# Patient Record
Sex: FEMALE | Race: WHITE | Employment: OTHER | URBAN - METROPOLITAN AREA
[De-identification: names, ages, dates, MRNs, and addresses within clinical notes are randomized per-mention and may not be internally consistent; named-entity substitution may affect disease eponyms.]

---

## 2017-01-01 ENCOUNTER — APPOINTMENT (OUTPATIENT)
Dept: LAB | Facility: HOSPITAL | Age: 82
End: 2017-01-01
Attending: INTERNAL MEDICINE
Payer: MEDICARE

## 2017-01-01 ENCOUNTER — SNF/IP PROF CHARGE ONLY (OUTPATIENT)
Dept: INTERNAL MEDICINE CLINIC | Facility: CLINIC | Age: 82
End: 2017-01-01

## 2017-01-01 DIAGNOSIS — R53.1 GENERAL WEAKNESS: ICD-10-CM

## 2017-01-01 DIAGNOSIS — M54.50 CHRONIC BILATERAL LOW BACK PAIN WITHOUT SCIATICA: ICD-10-CM

## 2017-01-01 DIAGNOSIS — I48.91 ATRIAL FIBRILLATION (HCC): ICD-10-CM

## 2017-01-01 DIAGNOSIS — M25.551 HIP PAIN, RIGHT: ICD-10-CM

## 2017-01-01 DIAGNOSIS — S32.000D: Primary | ICD-10-CM

## 2017-01-01 DIAGNOSIS — I10 ESSENTIAL HYPERTENSION: ICD-10-CM

## 2017-01-01 DIAGNOSIS — I48.20 CHRONIC ATRIAL FIBRILLATION (HCC): ICD-10-CM

## 2017-01-01 DIAGNOSIS — G89.29 CHRONIC BILATERAL LOW BACK PAIN WITHOUT SCIATICA: Primary | ICD-10-CM

## 2017-01-01 DIAGNOSIS — R26.2 DIFFICULTY WALKING: ICD-10-CM

## 2017-01-01 DIAGNOSIS — R26.2 DIFFICULTY IN WALKING: ICD-10-CM

## 2017-01-01 DIAGNOSIS — G89.29 CHRONIC BILATERAL LOW BACK PAIN WITHOUT SCIATICA: ICD-10-CM

## 2017-01-01 DIAGNOSIS — M54.50 CHRONIC BILATERAL LOW BACK PAIN WITHOUT SCIATICA: Primary | ICD-10-CM

## 2017-01-01 DIAGNOSIS — S32.050S: ICD-10-CM

## 2017-01-01 DIAGNOSIS — I48.20 ATRIAL FIBRILLATION, CHRONIC (HCC): ICD-10-CM

## 2017-01-01 DIAGNOSIS — Z79.01 LONG TERM (CURRENT) USE OF ANTICOAGULANTS: ICD-10-CM

## 2017-01-01 DIAGNOSIS — Z98.890 STATUS POST LUMBAR SPINE SURGERY FOR DECOMPRESSION OF SPINAL CORD: ICD-10-CM

## 2017-01-01 LAB
INR BLD: 2.7 (ref 0.9–1.2)
PROTHROMBIN TIME: 28.7 SECONDS (ref 11.8–14.5)

## 2017-01-01 PROCEDURE — 99308 SBSQ NF CARE LOW MDM 20: CPT | Performed by: INTERNAL MEDICINE

## 2017-01-01 PROCEDURE — 85610 PROTHROMBIN TIME: CPT

## 2017-01-01 PROCEDURE — 36415 COLL VENOUS BLD VENIPUNCTURE: CPT

## 2017-01-01 PROCEDURE — 99305 1ST NF CARE MODERATE MDM 35: CPT | Performed by: INTERNAL MEDICINE

## 2017-01-17 ENCOUNTER — APPOINTMENT (OUTPATIENT)
Dept: LAB | Facility: HOSPITAL | Age: 82
End: 2017-01-17
Attending: INTERNAL MEDICINE
Payer: MEDICARE

## 2017-01-17 DIAGNOSIS — Z79.01 LONG TERM (CURRENT) USE OF ANTICOAGULANTS: ICD-10-CM

## 2017-01-17 DIAGNOSIS — I48.91 ATRIAL FIBRILLATION (HCC): ICD-10-CM

## 2017-01-17 LAB
INR BLD: 2.3 (ref 0.9–1.2)
PROTHROMBIN TIME: 25.5 SECONDS (ref 11.8–14.5)

## 2017-01-17 PROCEDURE — 36415 COLL VENOUS BLD VENIPUNCTURE: CPT

## 2017-01-17 PROCEDURE — 85610 PROTHROMBIN TIME: CPT

## 2017-02-27 PROCEDURE — 99305 1ST NF CARE MODERATE MDM 35: CPT | Performed by: INTERNAL MEDICINE

## 2017-03-02 PROCEDURE — 99308 SBSQ NF CARE LOW MDM 20: CPT | Performed by: INTERNAL MEDICINE

## 2017-03-04 ENCOUNTER — SNF/IP PROF CHARGE ONLY (OUTPATIENT)
Dept: INTERNAL MEDICINE CLINIC | Facility: CLINIC | Age: 82
End: 2017-03-04

## 2017-03-04 DIAGNOSIS — I10 ESSENTIAL HYPERTENSION: ICD-10-CM

## 2017-03-04 DIAGNOSIS — M54.50 ACUTE BILATERAL LOW BACK PAIN WITHOUT SCIATICA: Primary | ICD-10-CM

## 2017-03-04 DIAGNOSIS — Z98.1 S/P LAMINECTOMY WITH SPINAL FUSION: ICD-10-CM

## 2017-03-04 DIAGNOSIS — I48.20 CHRONIC ATRIAL FIBRILLATION (HCC): ICD-10-CM

## 2017-03-04 DIAGNOSIS — R26.2 DIFFICULTY WALKING: ICD-10-CM

## 2017-03-06 ENCOUNTER — SNF/IP PROF CHARGE ONLY (OUTPATIENT)
Dept: INTERNAL MEDICINE CLINIC | Facility: CLINIC | Age: 82
End: 2017-03-06

## 2017-03-06 DIAGNOSIS — M54.50 CHRONIC BILATERAL LOW BACK PAIN WITHOUT SCIATICA: Primary | ICD-10-CM

## 2017-03-06 DIAGNOSIS — G89.29 CHRONIC BILATERAL LOW BACK PAIN WITHOUT SCIATICA: Primary | ICD-10-CM

## 2017-03-06 DIAGNOSIS — I48.20 CHRONIC ATRIAL FIBRILLATION (HCC): ICD-10-CM

## 2017-03-06 DIAGNOSIS — I10 ESSENTIAL HYPERTENSION: ICD-10-CM

## 2017-03-06 DIAGNOSIS — Z98.1 S/P LAMINECTOMY WITH SPINAL FUSION: ICD-10-CM

## 2017-03-06 DIAGNOSIS — R26.2 DIFFICULTY WALKING: ICD-10-CM

## 2017-03-06 PROCEDURE — 99308 SBSQ NF CARE LOW MDM 20: CPT | Performed by: INTERNAL MEDICINE

## 2017-03-08 PROCEDURE — 99308 SBSQ NF CARE LOW MDM 20: CPT | Performed by: INTERNAL MEDICINE

## 2017-03-12 ENCOUNTER — SNF/IP PROF CHARGE ONLY (OUTPATIENT)
Dept: INTERNAL MEDICINE CLINIC | Facility: CLINIC | Age: 82
End: 2017-03-12

## 2017-03-12 DIAGNOSIS — I48.20 CHRONIC ATRIAL FIBRILLATION (HCC): ICD-10-CM

## 2017-03-12 DIAGNOSIS — Z98.890 STATUS POST LAMINECTOMY: ICD-10-CM

## 2017-03-12 DIAGNOSIS — I10 ESSENTIAL HYPERTENSION: ICD-10-CM

## 2017-03-12 DIAGNOSIS — S32.000D COMPRESSION FRACTURE OF LUMBAR VERTEBRA, WITH ROUTINE HEALING, SUBSEQUENT ENCOUNTER: Primary | ICD-10-CM

## 2017-03-12 DIAGNOSIS — R26.2 DIFFICULTY WALKING: ICD-10-CM

## 2018-01-01 ENCOUNTER — APPOINTMENT (OUTPATIENT)
Dept: GENERAL RADIOLOGY | Facility: HOSPITAL | Age: 83
DRG: 536 | End: 2018-01-01
Payer: MEDICARE

## 2018-01-01 ENCOUNTER — HOSPITAL ENCOUNTER (INPATIENT)
Facility: HOSPITAL | Age: 83
LOS: 3 days | Discharge: NURSING, REHAB OR LONG-TERM CARE | DRG: 536 | End: 2018-01-01
Attending: EMERGENCY MEDICINE | Admitting: INTERNAL MEDICINE
Payer: MEDICARE

## 2018-01-01 ENCOUNTER — APPOINTMENT (OUTPATIENT)
Dept: CT IMAGING | Facility: HOSPITAL | Age: 83
DRG: 536 | End: 2018-01-01
Attending: EMERGENCY MEDICINE
Payer: MEDICARE

## 2018-01-01 ENCOUNTER — APPOINTMENT (OUTPATIENT)
Dept: MRI IMAGING | Facility: HOSPITAL | Age: 83
DRG: 536 | End: 2018-01-01
Attending: EMERGENCY MEDICINE
Payer: MEDICARE

## 2018-01-01 ENCOUNTER — SNF/IP PROF CHARGE ONLY (OUTPATIENT)
Dept: INTERNAL MEDICINE CLINIC | Facility: CLINIC | Age: 83
End: 2018-01-01

## 2018-01-01 VITALS
HEART RATE: 61 BPM | BODY MASS INDEX: 25.16 KG/M2 | SYSTOLIC BLOOD PRESSURE: 101 MMHG | HEIGHT: 60 IN | OXYGEN SATURATION: 96 % | RESPIRATION RATE: 16 BRPM | DIASTOLIC BLOOD PRESSURE: 35 MMHG | WEIGHT: 128.13 LBS | TEMPERATURE: 97 F

## 2018-01-01 DIAGNOSIS — S32.9XXA CLOSED NONDISPLACED FRACTURE OF PELVIS, UNSPECIFIED PART OF PELVIS, INITIAL ENCOUNTER (HCC): Primary | ICD-10-CM

## 2018-01-01 DIAGNOSIS — I48.20 CHRONIC ATRIAL FIBRILLATION (HCC): ICD-10-CM
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
## 2018-01-01 DIAGNOSIS — I10 ESSENTIAL HYPERTENSION: ICD-10-CM

## 2018-01-01 DIAGNOSIS — S32.9XXA CLOSED NONDISPLACED FRACTURE OF PELVIS, UNSPECIFIED PART OF PELVIS, INITIAL ENCOUNTER (HCC): ICD-10-CM

## 2018-01-01 LAB
ANION GAP SERPL CALC-SCNC: 4 MMOL/L (ref 0–18)
ANION GAP SERPL CALC-SCNC: 4 MMOL/L (ref 0–18)
ANION GAP SERPL CALC-SCNC: 8 MMOL/L (ref 0–18)
BASOPHILS # BLD: 0 K/UL (ref 0–0.2)
BASOPHILS NFR BLD: 0 %
BASOPHILS NFR BLD: 1 %
BASOPHILS NFR BLD: 1 %
BUN SERPL-MCNC: 28 MG/DL (ref 8–20)
BUN SERPL-MCNC: 30 MG/DL (ref 8–20)
BUN SERPL-MCNC: 32 MG/DL (ref 8–20)
BUN/CREAT SERPL: 19.2 (ref 10–20)
BUN/CREAT SERPL: 19.7 (ref 10–20)
BUN/CREAT SERPL: 23 (ref 10–20)
CALCIUM SERPL-MCNC: 7.7 MG/DL (ref 8.5–10.5)
CALCIUM SERPL-MCNC: 7.8 MG/DL (ref 8.5–10.5)
CALCIUM SERPL-MCNC: 8.2 MG/DL (ref 8.5–10.5)
CHLORIDE SERPL-SCNC: 111 MMOL/L (ref 95–110)
CHLORIDE SERPL-SCNC: 111 MMOL/L (ref 95–110)
CHLORIDE SERPL-SCNC: 112 MMOL/L (ref 95–110)
CO2 SERPL-SCNC: 24 MMOL/L (ref 22–32)
CREAT SERPL-MCNC: 1.39 MG/DL (ref 0.5–1.5)
CREAT SERPL-MCNC: 1.46 MG/DL (ref 0.5–1.5)
CREAT SERPL-MCNC: 1.52 MG/DL (ref 0.5–1.5)
EOSINOPHIL # BLD: 0.1 K/UL (ref 0–0.7)
EOSINOPHIL # BLD: 0.6 K/UL (ref 0–0.7)
EOSINOPHIL # BLD: 0.7 K/UL (ref 0–0.7)
EOSINOPHIL NFR BLD: 10 %
EOSINOPHIL NFR BLD: 2 %
EOSINOPHIL NFR BLD: 9 %
ERYTHROCYTE [DISTWIDTH] IN BLOOD BY AUTOMATED COUNT: 14.3 % (ref 11–15)
ERYTHROCYTE [DISTWIDTH] IN BLOOD BY AUTOMATED COUNT: 14.4 % (ref 11–15)
ERYTHROCYTE [DISTWIDTH] IN BLOOD BY AUTOMATED COUNT: 14.8 % (ref 11–15)
GLUCOSE SERPL-MCNC: 123 MG/DL (ref 70–99)
GLUCOSE SERPL-MCNC: 126 MG/DL (ref 70–99)
GLUCOSE SERPL-MCNC: 137 MG/DL (ref 70–99)
HCT VFR BLD AUTO: 31.3 % (ref 35–48)
HCT VFR BLD AUTO: 33.2 % (ref 35–48)
HCT VFR BLD AUTO: 33.5 % (ref 35–48)
HGB BLD-MCNC: 10.6 G/DL (ref 12–16)
HGB BLD-MCNC: 11.2 G/DL (ref 12–16)
HGB BLD-MCNC: 11.2 G/DL (ref 12–16)
INR BLD: 3.3 (ref 0.9–1.2)
INR BLD: 3.6 (ref 0.9–1.2)
INR BLD: 3.6 (ref 0.9–1.2)
LYMPHOCYTES # BLD: 1.1 K/UL (ref 1–4)
LYMPHOCYTES # BLD: 1.1 K/UL (ref 1–4)
LYMPHOCYTES # BLD: 1.4 K/UL (ref 1–4)
LYMPHOCYTES NFR BLD: 13 %
LYMPHOCYTES NFR BLD: 14 %
LYMPHOCYTES NFR BLD: 21 %
MAGNESIUM SERPL-MCNC: 1.9 MG/DL (ref 1.8–2.5)
MCH RBC QN AUTO: 33.8 PG (ref 27–32)
MCH RBC QN AUTO: 34.2 PG (ref 27–32)
MCH RBC QN AUTO: 34.2 PG (ref 27–32)
MCHC RBC AUTO-ENTMCNC: 33.5 G/DL (ref 32–37)
MCHC RBC AUTO-ENTMCNC: 33.8 G/DL (ref 32–37)
MCHC RBC AUTO-ENTMCNC: 33.9 G/DL (ref 32–37)
MCV RBC AUTO: 100.9 FL (ref 80–100)
MCV RBC AUTO: 100.9 FL (ref 80–100)
MCV RBC AUTO: 101.2 FL (ref 80–100)
MONOCYTES # BLD: 0.5 K/UL (ref 0–1)
MONOCYTES NFR BLD: 6 %
MONOCYTES NFR BLD: 7 %
MONOCYTES NFR BLD: 7 %
NEUTROPHILS # BLD AUTO: 4.1 K/UL (ref 1.8–7.7)
NEUTROPHILS # BLD AUTO: 5.5 K/UL (ref 1.8–7.7)
NEUTROPHILS # BLD AUTO: 6.4 K/UL (ref 1.8–7.7)
NEUTROPHILS NFR BLD: 61 %
NEUTROPHILS NFR BLD: 70 %
NEUTROPHILS NFR BLD: 79 %
OSMOLALITY UR CALC.SUM OF ELEC: 296 MOSM/KG (ref 275–295)
OSMOLALITY UR CALC.SUM OF ELEC: 298 MOSM/KG (ref 275–295)
OSMOLALITY UR CALC.SUM OF ELEC: 304 MOSM/KG (ref 275–295)
PLATELET # BLD AUTO: 114 K/UL (ref 140–400)
PLATELET # BLD AUTO: 119 K/UL (ref 140–400)
PLATELET # BLD AUTO: 133 K/UL (ref 140–400)
PMV BLD AUTO: 8.8 FL (ref 7.4–10.3)
PMV BLD AUTO: 8.8 FL (ref 7.4–10.3)
PMV BLD AUTO: 9 FL (ref 7.4–10.3)
POTASSIUM SERPL-SCNC: 4.1 MMOL/L (ref 3.3–5.1)
POTASSIUM SERPL-SCNC: 4.2 MMOL/L (ref 3.3–5.1)
POTASSIUM SERPL-SCNC: 4.4 MMOL/L (ref 3.3–5.1)
PROTHROMBIN TIME: 32.5 SECONDS (ref 11.8–14.5)
PROTHROMBIN TIME: 35 SECONDS (ref 11.8–14.5)
PROTHROMBIN TIME: 35.1 SECONDS (ref 11.8–14.5)
RBC # BLD AUTO: 3.11 M/UL (ref 3.7–5.4)
RBC # BLD AUTO: 3.28 M/UL (ref 3.7–5.4)
RBC # BLD AUTO: 3.32 M/UL (ref 3.7–5.4)
SODIUM SERPL-SCNC: 139 MMOL/L (ref 136–144)
SODIUM SERPL-SCNC: 140 MMOL/L (ref 136–144)
SODIUM SERPL-SCNC: 143 MMOL/L (ref 136–144)
WBC # BLD AUTO: 6.7 K/UL (ref 4–11)
WBC # BLD AUTO: 7.8 K/UL (ref 4–11)
WBC # BLD AUTO: 8.2 K/UL (ref 4–11)

## 2018-01-01 PROCEDURE — 99308 SBSQ NF CARE LOW MDM 20: CPT | Performed by: INTERNAL MEDICINE

## 2018-01-01 PROCEDURE — 83735 ASSAY OF MAGNESIUM: CPT | Performed by: HOSPITALIST

## 2018-01-01 PROCEDURE — 85610 PROTHROMBIN TIME: CPT | Performed by: EMERGENCY MEDICINE

## 2018-01-01 PROCEDURE — 36415 COLL VENOUS BLD VENIPUNCTURE: CPT

## 2018-01-01 PROCEDURE — 80048 BASIC METABOLIC PNL TOTAL CA: CPT | Performed by: HOSPITALIST

## 2018-01-01 PROCEDURE — 73700 CT LOWER EXTREMITY W/O DYE: CPT | Performed by: EMERGENCY MEDICINE

## 2018-01-01 PROCEDURE — 85610 PROTHROMBIN TIME: CPT | Performed by: HOSPITALIST

## 2018-01-01 PROCEDURE — 85730 THROMBOPLASTIN TIME PARTIAL: CPT | Performed by: EMERGENCY MEDICINE

## 2018-01-01 PROCEDURE — 72131 CT LUMBAR SPINE W/O DYE: CPT | Performed by: EMERGENCY MEDICINE

## 2018-01-01 PROCEDURE — 96372 THER/PROPH/DIAG INJ SC/IM: CPT

## 2018-01-01 PROCEDURE — 97162 PT EVAL MOD COMPLEX 30 MIN: CPT

## 2018-01-01 PROCEDURE — 73502 X-RAY EXAM HIP UNI 2-3 VIEWS: CPT | Performed by: EMERGENCY MEDICINE

## 2018-01-01 PROCEDURE — 80048 BASIC METABOLIC PNL TOTAL CA: CPT | Performed by: INTERNAL MEDICINE

## 2018-01-01 PROCEDURE — 85025 COMPLETE CBC W/AUTO DIFF WBC: CPT | Performed by: HOSPITALIST

## 2018-01-01 PROCEDURE — 72148 MRI LUMBAR SPINE W/O DYE: CPT | Performed by: EMERGENCY MEDICINE

## 2018-01-01 PROCEDURE — A4216 STERILE WATER/SALINE, 10 ML: HCPCS | Performed by: INTERNAL MEDICINE

## 2018-01-01 PROCEDURE — 99285 EMERGENCY DEPT VISIT HI MDM: CPT

## 2018-01-01 PROCEDURE — 97166 OT EVAL MOD COMPLEX 45 MIN: CPT

## 2018-01-01 PROCEDURE — 85025 COMPLETE CBC W/AUTO DIFF WBC: CPT | Performed by: INTERNAL MEDICINE

## 2018-01-01 PROCEDURE — 99305 1ST NF CARE MODERATE MDM 35: CPT | Performed by: INTERNAL MEDICINE

## 2018-01-01 PROCEDURE — 97110 THERAPEUTIC EXERCISES: CPT

## 2018-01-01 PROCEDURE — 97116 GAIT TRAINING THERAPY: CPT

## 2018-01-01 RX ORDER — MORPHINE SULFATE 4 MG/ML
4 INJECTION, SOLUTION INTRAMUSCULAR; INTRAVENOUS EVERY 2 HOUR PRN
Status: DISCONTINUED | OUTPATIENT
Start: 2018-01-01 | End: 2018-01-01

## 2018-01-01 RX ORDER — BISACODYL 10 MG
10 SUPPOSITORY, RECTAL RECTAL
Status: DISCONTINUED | OUTPATIENT
Start: 2018-01-01 | End: 2018-01-01

## 2018-01-01 RX ORDER — LOSARTAN POTASSIUM 100 MG/1
100 TABLET ORAL DAILY
COMMUNITY

## 2018-01-01 RX ORDER — MECLIZINE HYDROCHLORIDE 25 MG/1
25 TABLET ORAL 3 TIMES DAILY PRN
COMMUNITY

## 2018-01-01 RX ORDER — POLYETHYLENE GLYCOL 3350 17 G/17G
17 POWDER, FOR SOLUTION ORAL DAILY PRN
Status: DISCONTINUED | OUTPATIENT
Start: 2018-01-01 | End: 2018-01-01

## 2018-01-01 RX ORDER — MECLIZINE HYDROCHLORIDE 25 MG/1
25 TABLET ORAL 3 TIMES DAILY PRN
Status: DISCONTINUED | OUTPATIENT
Start: 2018-01-01 | End: 2018-01-01

## 2018-01-01 RX ORDER — AMIODARONE HYDROCHLORIDE 100 MG/1
100 TABLET ORAL DAILY
COMMUNITY

## 2018-01-01 RX ORDER — HYDRALAZINE HYDROCHLORIDE 20 MG/ML
5 INJECTION INTRAMUSCULAR; INTRAVENOUS EVERY 6 HOURS PRN
Status: DISCONTINUED | OUTPATIENT
Start: 2018-01-01 | End: 2018-01-01

## 2018-01-01 RX ORDER — ACETAMINOPHEN 325 MG/1
650 TABLET ORAL EVERY 6 HOURS PRN
Qty: 30 TABLET | Refills: 0 | Status: SHIPPED | OUTPATIENT
Start: 2018-01-01

## 2018-01-01 RX ORDER — LOSARTAN POTASSIUM 100 MG/1
100 TABLET ORAL DAILY
Status: DISCONTINUED | OUTPATIENT
Start: 2018-01-01 | End: 2018-01-01

## 2018-01-01 RX ORDER — AMIODARONE HYDROCHLORIDE 200 MG/1
100 TABLET ORAL DAILY
Status: DISCONTINUED | OUTPATIENT
Start: 2018-01-01 | End: 2018-01-01

## 2018-01-01 RX ORDER — ACETAMINOPHEN 325 MG/1
650 TABLET ORAL EVERY 6 HOURS PRN
Status: DISCONTINUED | OUTPATIENT
Start: 2018-01-01 | End: 2018-01-01

## 2018-01-01 RX ORDER — MORPHINE SULFATE 2 MG/ML
2 INJECTION, SOLUTION INTRAMUSCULAR; INTRAVENOUS ONCE
Status: COMPLETED | OUTPATIENT
Start: 2018-01-01 | End: 2018-01-01

## 2018-01-01 RX ORDER — LEVOTHYROXINE SODIUM 0.03 MG/1
25 TABLET ORAL
Status: DISCONTINUED | OUTPATIENT
Start: 2018-01-01 | End: 2018-01-01

## 2018-01-01 RX ORDER — HEPARIN SODIUM 5000 [USP'U]/ML
5000 INJECTION, SOLUTION INTRAVENOUS; SUBCUTANEOUS EVERY 8 HOURS SCHEDULED
Status: DISCONTINUED | OUTPATIENT
Start: 2018-01-01 | End: 2018-01-01

## 2018-01-01 RX ORDER — WARFARIN SODIUM 2 MG/1
2 TABLET ORAL NIGHTLY
COMMUNITY

## 2018-01-01 RX ORDER — MORPHINE SULFATE 2 MG/ML
2 INJECTION, SOLUTION INTRAMUSCULAR; INTRAVENOUS EVERY 2 HOUR PRN
Status: DISCONTINUED | OUTPATIENT
Start: 2018-01-01 | End: 2018-01-01

## 2018-01-01 RX ORDER — LEVOTHYROXINE SODIUM 0.03 MG/1
25 TABLET ORAL
COMMUNITY

## 2018-01-01 RX ORDER — ONDANSETRON 2 MG/ML
4 INJECTION INTRAMUSCULAR; INTRAVENOUS EVERY 6 HOURS PRN
Status: DISCONTINUED | OUTPATIENT
Start: 2018-01-01 | End: 2018-01-01

## 2018-01-01 RX ORDER — SODIUM CHLORIDE 0.9 % (FLUSH) 0.9 %
3 SYRINGE (ML) INJECTION AS NEEDED
Status: DISCONTINUED | OUTPATIENT
Start: 2018-01-01 | End: 2018-01-01

## 2018-01-01 RX ORDER — MORPHINE SULFATE 2 MG/ML
1 INJECTION, SOLUTION INTRAMUSCULAR; INTRAVENOUS EVERY 2 HOUR PRN
Status: DISCONTINUED | OUTPATIENT
Start: 2018-01-01 | End: 2018-01-01

## 2018-01-01 RX ORDER — ACETAMINOPHEN AND CODEINE PHOSPHATE 300; 30 MG/1; MG/1
1 TABLET ORAL EVERY 4 HOURS PRN
Status: DISCONTINUED | OUTPATIENT
Start: 2018-01-01 | End: 2018-01-01

## 2018-01-01 RX ORDER — TRAMADOL HYDROCHLORIDE 50 MG/1
50 TABLET ORAL 3 TIMES DAILY
Status: DISCONTINUED | OUTPATIENT
Start: 2018-01-01 | End: 2018-01-01

## 2018-01-01 RX ORDER — DOCUSATE SODIUM 100 MG/1
100 CAPSULE, LIQUID FILLED ORAL 2 TIMES DAILY
Status: DISCONTINUED | OUTPATIENT
Start: 2018-01-01 | End: 2018-01-01

## 2018-01-01 RX ORDER — TRAMADOL HYDROCHLORIDE 50 MG/1
50 TABLET ORAL EVERY 8 HOURS PRN
Qty: 20 TABLET | Refills: 0 | Status: SHIPPED | OUTPATIENT
Start: 2018-01-01 | End: 2018-01-01

## 2018-01-01 RX ORDER — MORPHINE SULFATE 2 MG/ML
1 INJECTION, SOLUTION INTRAMUSCULAR; INTRAVENOUS EVERY 4 HOURS PRN
Status: DISCONTINUED | OUTPATIENT
Start: 2018-01-01 | End: 2018-01-01

## 2018-03-02 PROBLEM — S32.9XXA CLOSED NONDISPLACED FRACTURE OF PELVIS (HCC): Status: ACTIVE | Noted: 2018-01-01

## 2018-03-02 PROBLEM — S32.9XXA CLOSED NONDISPLACED FRACTURE OF PELVIS, UNSPECIFIED PART OF PELVIS, INITIAL ENCOUNTER (HCC): Status: ACTIVE | Noted: 2018-01-01

## 2018-03-03 NOTE — ED INITIAL ASSESSMENT (HPI)
Pt received via EMS with c/o right hip pain. Pt states she was walking inside the house and tripped over some wires and fell. Pt is on Coumadin. Denies hitting head or LOC. C/o right hip pain.

## 2018-03-03 NOTE — ED PROVIDER NOTES
Patient Seen in: [facility] AND [facility] Emergency Department     History   Patient presents with:  Fall (musculoskeletal, neurologic)    Stated Complaint: fall    HPI    80year old female complains of tripping over wires at home today and falling, landing on h membranes are not pale and not cyanotic. Eyes: Conjunctivae and lids are normal. Right conjunctiva is not injected. Left conjunctiva is not injected. No scleral icterus. Pupils are equal.   Neck: Normal range of motion and phonation normal. Neck supple. FINDINGS:     BONES: Moderate narrowing of the right hip joint with acetabular and     femoral head osteophytes. Sclerosis within the femoral neck is seen,     secondary to healed fracture.  Small subchondral cysts are seen within the     acetabulum and fem joints. Degenerative disc and facet disease is partially visualized in the     lower lumbar spine with multiple lower lumbar kyphoplasties. Degenerative     changes partially visualized in the left hip. SOFT TISSUES: Negative.   No visible soft tissue sw for pubic ramus fracture. I discussed these results, their acute management issues, and the need for follow-up, with the patient/gaurdian. See radiology reports for details.      Monitor Interpretation: normal sinus rhythm with a rate of 70  Oxygen Saturati guidance, discharge instructions upon discharge, disease/injury specific precautions, return instructions, and follow up information were provided prior to discharge from the ED if sent home.  We also recommended that the patient schedule follow up care wit

## 2018-03-03 NOTE — H&P
VAL Hospitalist H&P       CC: Patient presents with:  Fall (musculoskeletal, neurologic)       PCP: PHYSICIAN NONSTAFF    History of Present Illness: Patient is a 80year old female with PMH sig for afib on warfarin, HTN, Hypothyroidism, who presents fol 25.02 kg/m²   General:  Alert, no distress   Head:  Normocephalic, without obvious abnormality, atraumatic. Eyes:  Sclera anicteric, No conjunctival pallor, EOMs intact. Nose: Nares normal. Septum midline. Mucosa normal. No drainage.    Throat: Lips, m 5. Multilevel foraminal narrowing as described. 6. Atherosclerotic vascular calcification. 7. Calcified gallstone. 8. Sigmoid colon diverticulosis.  9. Greater than 7 cm partially visualized right renal cyst.     No major discrepancy with preliminary [facility] INR 3.6  Atrophy: IS  Lines: PIV    Dispo: pending clinical course    Outpatient records or previous hospital records reviewed. Further recommendations pending patient's clinical course.   [facility] hospitalist to continue to follow patient while in house

## 2018-03-03 NOTE — PHYSICAL THERAPY NOTE
Chart reviewed        Discussed pt with RN ----pt with an ortho consult pending from Dr. Didier Martin ------pt with MRI pending as well . PT is following , requesting WB status and PT clearance from ortho team prior to visit.     PT will proceed with care

## 2018-03-03 NOTE — CM/SW NOTE
Addend 457pm: SW spoke w/ pt's daughter Olga Garcia to discuss dc plans. Pt lives at home w/ her daughter and son in law, who both work during the day. Pt lives in a 2 level house w/ 13 stairs.  Pt is independent w/ most ADL's, but needs assistance w/ driving, cl

## 2018-03-04 NOTE — PHYSICAL THERAPY NOTE
PHYSICAL THERAPY EVALUATION - INPATIENT     Room Number: 418/418-A  Evaluation Date: 3/4/2018  Type of Evaluation: Initial   Physician Order: PT Eval and Treat    Presenting Problem: pelvis fx  Reason for Therapy: Mobility Dysfunction and Discharge Planni s/p mechanical fall    Problem List  Principal Problem:    Closed nondisplaced fracture of pelvis, unspecified part of pelvis, initial encounter [facility])  Active Problems:    Closed nondisplaced fracture of pelvis [facility])      Past Medical History  Past Medical Sitting: Fair -  Dynamic Sitting: Poor +  Static Standing: Poor  Dynamic Standing: Poor -    ADDITIONAL TESTS  Additional Tests: None       NEUROLOGICAL FINDINGS  Neurological Findings: None       ACTIVITY TOLERANCE  O2 Saturation at rest 94% and with acti transfers EOB to/from Chair/Wheelchair at assistance level: supervision with walker - rolling     Goal #2  Current Status    Goal #3 Patient is able to ambulate 50 feet with assist device: walker - rolling at assistance level: supervision   Goal #3   Gilma

## 2018-03-04 NOTE — PROGRESS NOTES
VAL Hospitalist Progress Note     CC: Hospital Follow up    PCP: PHYSICIAN NONSTAFF       Assessment/Plan:     Principal Problem:    Closed nondisplaced fracture of pelvis, unspecified part of pelvis, initial encounter [facility])  Active Problems:    Closed non Weights  03/02/18 2319 : 128 lb 1.6 oz (58.1 kg)  03/02/18 1845 : 125 lb (56.7 kg)  03/03/18 0508 : 128 lb 1.4 oz (58.1 kg)      Exam  Gen: No acute distress, alert and oriented x3   Heent: NC AT, mucous memb clear   Pulm: Lungs clear, normal respiratory e diverticulosis. 9. Greater than 7 cm partially visualized right renal cyst.     No major discrepancy with preliminary [facility] radiology report.         Mri Spine Lumbar (cpt=72148)    Result Date: 3/3/2018  CONCLUSION:   Subtle edema partially seen within th

## 2018-03-04 NOTE — PLAN OF CARE
DISCHARGE PLANNING    • Discharge to home or other facility with appropriate resources Progressing    [facility] w [address]    • Verbalizes/displays adequate comfort level or patient's stated pain goal Progressing

## 2018-03-04 NOTE — OCCUPATIONAL THERAPY NOTE
OCCUPATIONAL THERAPY EVALUATION - INPATIENT      Room Number: 428/428-A  Evaluation Date: 3/4/2018  Type of Evaluation: Initial  Presenting Problem: mechanical fall with pubic rami fx    Physician Order: IP Consult to Occupational Therapy  Reason for GERARDO BUENROSTRO [facility] Closed nondisplaced fracture of pelvis, unspecified part of pelvis, initial encounter [facility])  Active Problems:    Closed nondisplaced fracture of pelvis [facility])      Past Medical History  Past Medical History:   Diagnosis Date   • A-fib [facility])    • Arrhythmia NEUROLOGICAL FINDINGS  Neurological Findings: None                ACTIVITIES OF DAILY LIVING ASSESSMENT  AM-PAC ‘6-Clicks’ Inpatient Daily Activity Short Form  How much help from another person does the patient currently need…  -

## 2018-03-04 NOTE — PLAN OF CARE
Dr. Tavia Cordova notified that CT scan shows a fracture. Ok to continue with PT and resume order to [facility].

## 2018-03-05 NOTE — CM/SW NOTE
The pt's dtr. Manas Martinez confirmed her choice for [facility] for rehab as the pt. Has been there 3 times before. The pt. Is scheduled to discharge to [facility] today 3/5 at 3p, via [address] [facility]. The pt and her dtr.  Are aware of medicar costs and

## 2018-03-05 NOTE — CM/SW NOTE
MD order received regarding rehab at [facility] or [facility]. The pt's family's first choice for rehab was [facility]. Message left for the pt's dtr. Vikki Sauceda to confirm the plan is still the same. Waiting on return call.       Vikki Sauceda Cell [phone]

## 2018-03-05 NOTE — CONSULTS
Ortho  79 y/o female who sustained a mechanical fall at home Friday afternoon. I was not aware I was on consult until this afternoon. She was brought to the ED c/o R hip pain. Plain R hip films + CT revealed a R S. Pubic ramus fx.  She was nonambulatory a

## 2018-03-05 NOTE — DISCHARGE SUMMARY
General Medicine Discharge Summary     Patient ID:  Checo Munguia  80year old  1/25/1927    Admit date: 3/2/2018    Discharge date and time: 3/5/18    Attending Physician: Carolann Meyers MD     Consults: IP CONSULT TO ORTHOPEDIC SURGERY  IP CONSULT TO SOCI found to have superior medial pubic ramus fracture and sacral insufficiency fractures, ortho consulted, recommended WBAT, pain control. Patient was recommended for [facility], chose [facility] as she has been there 3 times before.   Had long discussion with yanira disc and facet degeneration with ligamentum flavum hypertrophy. 4. Multilevel spinal stenosis-severe L3-L4, moderate-severe L4-5, moderate L2-L3, mild-moderate L5-S1. 5. Multilevel foraminal narrowing as described.  6. Atherosclerotic vascular calcification ANTIVERT     metoprolol Tartrate 25 MG Tabs  Commonly known as:  LOPRESSOR     Warfarin Sodium 2 MG Tabs  Commonly known as:  COUMADIN           Where to Get Your Medications      You can get these medications from any pharmacy    Bring a paper prescriptio Tabs  Commonly known as:  LOPRESSOR     Warfarin Sodium 2 MG Tabs  Commonly known as:  COUMADIN           Where to Get Your Medications      You can get these medications from any pharmacy    Bring a paper prescription for each of these medications  · acet

## 2018-03-05 NOTE — PHYSICAL THERAPY NOTE
PHYSICAL THERAPY EVALUATION - INPATIENT     Room Number: 418/418-A  Evaluation Date: 3/4/2018  Type of Evaluation: Initial   Physician Order: PT Eval and Treat    Presenting Problem: pelvis fx  Reason for Therapy: Mobility Dysfunction and Discharge Planni Level of [employer]: Per pt report, she was living with family in a 2 story house, had a part-time caregiver 3 days per week.  Pt was furniture walking, not using device, but was able to ascend/descend 13 stairs to 2nd level bed/bath with SV/assist from f in hospital room?: A Lot   -   Climbing 3-5 steps with a railing?: Total     AM-PAC Score:  Raw Score: 11   PT Approx Degree of Impairment Score: 72.57%   Standardized Score (AM-PAC Scale): 33.86   CMS Modifier (G-Code): CL    FUNCTIONAL ABILITY STATUS  [place]

## 2018-03-05 NOTE — HOME CARE LIAISON
DIAGNOSES AND PERTINENT MEDICAL HISTORY: CLOSED NONDISPLACED FRACTURE OF PELVIS ([facility] [address]  NOTED: 3/2/2018    FACILITY NAME AND DC DATE: [facility]    BEDSIDE VISIT WITH: PTNT    SERVICES ORDERED: RN PT     VERIFIED PATIENT ADDRESS, PHONE NUMBER AND [place]

## 2018-03-23 ENCOUNTER — TELEPHONE (OUTPATIENT)
Dept: INTERNAL MEDICINE CLINIC | Facility: CLINIC | Age: 83
End: 2018-03-23

## (undated) NOTE — IP AVS SNAPSHOT
Patient Demographics     Address  [place]   [address] [address] Phone  [phone] [facility])      Emergency Contact(s)     Name Relation Home Work Mobile    Karen Malloy Daughter [phone]      Allergies as of 3/5/2018  Reviewed on: 3/2/ Specialties:  Surgery, Hand & Microvascular, PHYSIATRY  Contact information:  [address]  [phone]             follow up with PCP in 1 week. Schedule an appointment as soon as possible for a visit in 1 week.     Why: [id number] acetaminophen (TYLENOL) tab 650 mg 03/05/18 0652 Given      [id number] amiodarone HCl (PACERONE) tab 100 mg 03/05/18 0822 Given      [id number] docusate sodium (COLACE) cap 100 mg 03/04/18 2047 Given      [id number] docusate sodium (COLACE) cap 100 GFR, Non-African American 33 >=60 [facility]   GFR, -American 38 >=60 [facility]   Comment:           Estimated GFR units: mL/min/1.73 square meters   eGFR calculated by the CKD-EPI equation.             PROTHROMBIN TIME (PT) [[id number]] (Abn Lab - Abbreviation Name Director Address Valid Date Range    [address] [facility] Dheeraj Jaime. Donnie Dior M.D. [facility]. [address] [place] [id number] 04/29/14 1547 - Present            Microbiology Results (All)     None         H&P - H&P Note      H daily. Disp:  Rfl:    Warfarin Sodium 2 MG Oral Tab Take 2 mg by mouth nightly. Disp:  Rfl:          Soc Hx     Smoking status: Never Smoker    Smokeless tobacco: Never Used    Alcohol use No        Fam Hx  History reviewed. No pertinent family history. CONCLUSION:   Subtle nondisplaced fracture of the superior-medial superior pubic ramus. No right hip fracture. Moderate right hip osteoarthritis with prior femoral neck screws. Ct Spine Lumbar (cpt=72131)    Result Date: 3/3/2018  CONCLUSION:  1. - no other fracture idendified  - ortho consulted  - likely WBAT, and pain control  - PT consulted  - tylenol, T3, morphine for pain control  - likely will needs SNF    Bilateral sacral insufficiency fractures / edema   - noted on MRI  - likely hs of osteo PSH- ORIF R femoral neck fx (healed) in the past. No new R hip fx. FH- mother  age [de-identified] unk cause.; father  age 68 of pharyngeal Ca. SH- smokes-; drinks- rarely; NKDA; meds- list med list but pt must be anticoagulated at home with INR of >3.    [address] Risk For Readmission:[TN.1] HIGH[TN.2]    Discharge Diagnoses: Closed nondisplaced fracture of pelvis, unspecified part of pelvis, initial encounter ([facility] [address] [S32. 9XXA]  See Additional Discharge Diagnoses in Hospital Course    Discharged Condition:[TN.1] good bleeding from falls, family prefer to discuss with outpatient cardiologist and continue for now, understand risks.   Patient DC to [facility], fu with PCP, ortho and cardiology.       Superior medial pubic ramus fx / s/ p mechanical fall  - CT hip with noted fract partially visualized right renal cyst.     No major discrepancy with preliminary [facility] radiology report.         Mri Spine Lumbar (cpt=72148)    Result Date: 3/3/2018  CONCLUSION:   Subtle edema partially seen within the right sacral ala with findings sugg Bring a paper prescription for each of these medications  · acetaminophen 325 MG Tabs         FU  Follow-up Information     follow up with cardiology. Schedule an appointment as soon as possible for a visit in 1 week.     Why:  follow up with cardiology, to Bring a paper prescription for each of these medications  · acetaminophen 325 MG Tabs                 Total Time Coordinating Care: Greater than 30 minutes    Patient had opportunity to ask questions and state understand and agree with therapeutic plan as PLAN  PT Treatment Plan: Bed mobility; Endurance;Gait training  Rehab Potential : Good  Frequency (Obs): Daily       PHYSICAL THERAPY MEDICAL/SOCIAL HISTORY     History related to current admission: s/p mechanical fall    Problem List  Principal Problem: Upper extremity ROM and strength are within functional limits     Lower extremity ROM is within functional limits, except R knee flexion and hip flexion are limited by pain    Lower extremity strength is grossly 3+/5    BALANCE  Static Sitting: Fair  [place] Goal #1 Patient is able to demonstrate supine - sit EOB @ level: supervision     Goal #1   Current Status Mod a   Goal #2 Patient is able to demonstrate transfers EOB to/from Chair/Wheelchair at assistance level: supervision with walker - rolling     Goal for hand placement and sequencing during bed mobility and transfer.  Pt presents with increased breathing after transfer, O2 sat 88% on RA, with verbal cues for breathing technique pt able to increased O2 sat to 94% at rest. Pt educated on role of PT, goals Lives With: Daughter;Family  Drives: No  Patient Owned Equipment: Rolling walker;Cane[RP.2]       Prior Level of [place]: Per pt report, she was living with family in a 2 story house, had a part-time caregiver 3 days per week.  Pt was furniture walking -   Sitting down on and standing up from a chair with arms (e.g., wheelchair, bedside commode, etc.): A Lot   -   Moving from lying on back to sitting on the side of the bed?: A Lot[RP.2]   How much help from another person does the patient currently need. instructions provided to patient in preparation for discharge. Goal #5   Current Status    Goal #6    Goal #6  Current Status[RP.1]         Attribution Castro    RP. 1 Tha Peña, PT on 3/4/2018  2:37 PM  RP. 2 Tha Peña, PT on 3/4/2018  2:46 PM Filed:  3/4/2018  3:22 PM Date of Service:  3/4/2018  1:35 PM Status:  Addendum    :  Dorys Holt OT (Occupational Therapist)    Related Notes:  Original Note by Dorys Holt OT (Occupational Therapist) filed at 3/4/2018  1:58 PM       [id number] [facility] OT Discharge Recommendations: Sub-acute rehabilitation  OT Device Recommendations: TBD[EA.2]    PLAN[EA.1]  OT Treatment Plan: Balance activities; ADL training;Functional transfer training;UE strengthening/ROM; Endurance training;Patient/Family education[EA. PAIN ASSESSMENT[EA.1]  Rating: Unable to rate  Location: right and left quad and groin  Management Techniques: Relaxation;Repositioning[EA. 2]    ACTIVITY TOLERANCE[EA.1]  O2 Saturation at rest 94% and with activity 88%  Room air  Heart Rate: 61  Blood Pres Toileting:[EA.1] NT[EA.3]  Upper Body Dressing:[EA.1] set up A[EA.3]  Lower Body Dressing:[EA.1] Max A to don socks[EA. 3]    Education Provided:[EA.1] role of OT, functional transfers, WBAT, pacing[EA.3]    Patient End of Session: Up in chair;Needs met; Rob mechanical fall with subtle non-displaced fracture superior medial pubic ramus. [EA.1] Per Ortho pt may be WBAT. HUMA Bennett) approved participation in OT evaluation. Pt received in bed, agreeable to tx. She reports no pain at rest, only with movement.  She c • Thyroid disease[EA.2]        Past Surgical History[EA.1]  Past Surgical History:  No date: FRACTURE SURGERY  No date: HIP SURGERY      Comment: LEFT HIP PINNING[EA.2]    HOME SITUATION[EA. 1]  Type of Home: House  Home Layout: Two level  [place] [address] AM-PAC ‘6-Clicks’ Inpatient Daily Activity Short Form  How much help from another person does the patient currently need…[EA.1]  -   Putting on and taking off regular lower body clothing?: A Lot  -   Bathing (including washing, rinsing, drying)?: A Lot  - Patient will[EA.1] tolerate standing x3 minutes with CGA in prep for ADLs[EA. 3]  Comment:         Goals  on:[EA.1]3/18/18[EA. 3]  Frequency:[EA.1]3-5x/week[EA. 3]     Attribution Castro    EA. 1 - Zoë Doe OT on 3/4/2018  1:35 PM  EA. 2 - ROSIBEL Jones